# Patient Record
Sex: FEMALE | URBAN - METROPOLITAN AREA
[De-identification: names, ages, dates, MRNs, and addresses within clinical notes are randomized per-mention and may not be internally consistent; named-entity substitution may affect disease eponyms.]

---

## 2017-01-02 ENCOUNTER — OFFICE VISIT (OUTPATIENT)
Dept: DERMATOLOGY | Facility: CLINIC | Age: 52
End: 2017-01-02
Payer: COMMERCIAL

## 2017-01-02 VITALS — HEART RATE: 69 BPM | OXYGEN SATURATION: 98 % | SYSTOLIC BLOOD PRESSURE: 120 MMHG | DIASTOLIC BLOOD PRESSURE: 74 MMHG

## 2017-01-02 DIAGNOSIS — L30.9 FACIAL DERMATITIS: Primary | ICD-10-CM

## 2017-01-02 PROCEDURE — 99212 OFFICE O/P EST SF 10 MIN: CPT | Performed by: PHYSICIAN ASSISTANT

## 2017-01-02 RX ORDER — DOXYCYCLINE 100 MG/1
CAPSULE ORAL
Qty: 120 CAPSULE | Refills: 0 | Status: SHIPPED | OUTPATIENT
Start: 2017-01-02

## 2017-01-02 RX ORDER — PIMECROLIMUS 10 MG/G
CREAM TOPICAL
Qty: 60 G | Refills: 3 | Status: SHIPPED | OUTPATIENT
Start: 2017-01-02

## 2017-01-02 NOTE — PROGRESS NOTES
HPI:   Jessica Du is a 51 year old female who presents for recheck of rash on face. Has been using Elidel, doxycycline and did a 5 day course of prednisone and is considerably improved.   chief complaint  Location: face - started around eyelids and then spread to forehead, cheeks and around mouth   Condition present for:  2 months.   Previous treatments include: miconazole and amoxicillin    Review Of Systems  Eyes: negative  Ears/Nose/Throat: negative  Respiratory: No shortness of breath, dyspnea on exertion, cough, or hemoptysis  Cardiovascular: negative  Gastrointestinal: negative  Genitourinary: negative  Musculoskeletal: negative  Neurologic: negative  Psychiatric: negative    Currently attending [facility] from [place], [place].     PHYSICAL EXAM:      Skin exam performed as follows: Type 2 skin. Mood appropriate  Alert and Oriented X 3. Well developed, well nourished in no distress.  General appearance: Normal  Head including face: Normal  Eyes: conjunctiva and lids: Normal  Mouth: Lips, teeth, gums: Normal  Neck: Normal  Chest-breast/axillae: Normal  Back: Normal  Spleen and liver: Normal  Cardiovascular: Exam of peripheral vascular system by observation for swelling, varicosities, edema: Normal  Genitalia: groin, buttocks: Normal  Extremities: digits/nails (clubbing): Normal  Eccrine and Apocrine glands: Normal  Right upper extremity: Normal  Left upper extremity: Normal  Right lower extremity: Normal  Left lower extremity: Normal  Skin: Scalp and body hair: See below    1. Eczematous dermatitis with flaking on face    ASSESSMENT/PLAN:     1. Facial dermatitis; suspect contact allergy - Much improved today. Has a few residual areas around mouth but otherwise is resolved. Using the Elidel BID and doxycycline. Tolerating both well. Will rx for written prescriptions as she will be leaving to go back to [place]. Advised can call for refills if struggling. .   --Continue doxycycline 100 mg BID x 1-2 more months  then d/c. Advised to take with food. Discussed risk of GI upset, esophagitis and photosensitivity.   --Continue Elidel BID until completely clear, then slowly decrease  --Gentle cleanser BID  --Cerave AM/PM respectively        Follow-up: PRN  CC:   Scribed By: Oxana Wynn, MS, PA-C